# Patient Record
Sex: FEMALE | Race: WHITE | NOT HISPANIC OR LATINO | Employment: STUDENT | URBAN - METROPOLITAN AREA
[De-identification: names, ages, dates, MRNs, and addresses within clinical notes are randomized per-mention and may not be internally consistent; named-entity substitution may affect disease eponyms.]

---
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
## 2019-01-03 ENCOUNTER — ANNUAL EXAM (OUTPATIENT)
Dept: OBGYN CLINIC | Facility: CLINIC | Age: 19
End: 2019-01-03
Payer: COMMERCIAL

## 2019-01-03 VITALS
DIASTOLIC BLOOD PRESSURE: 100 MMHG | HEIGHT: 63 IN | BODY MASS INDEX: 25.41 KG/M2 | WEIGHT: 143.4 LBS | SYSTOLIC BLOOD PRESSURE: 160 MMHG

## 2019-01-03 DIAGNOSIS — Z30.09 ENCOUNTER FOR COUNSELING REGARDING CONTRACEPTION: ICD-10-CM

## 2019-01-03 DIAGNOSIS — Z01.419 ENCOUNTER FOR ANNUAL ROUTINE GYNECOLOGICAL EXAMINATION: Primary | ICD-10-CM

## 2019-01-03 PROCEDURE — S0612 ANNUAL GYNECOLOGICAL EXAMINA: HCPCS | Performed by: OBSTETRICS & GYNECOLOGY

## 2019-01-03 NOTE — PROGRESS NOTES
Assessment/Plan:    Elevated blood pressure-recheck at the end of her visit was the same  We discussed Depo-Provera as a possible option since estrogen containing birth control may be contributing to her elevated blood pressure  She will remove the patch today and have her father recheck her blood pressure several times in the next week or 2  Her father is a family doctor  Assuming it decreases, she will start Depo-Provera with her next menstrual cycle  We did discuss that sometimes Depo-Provera can cause mood changes and I would recommend a visit at the time of her 2nd dose to see how she was doing  Also, she is in college at [employer] in which is close by and her mother talks to her on a regular basis  We discussed this with her mother and she will watch her closely  I would like to make sure her blood pressure is in a normal were close to normal range before starting Depo-Provera  They are agreeable they will call us with some blood pressure readings tomorrow and in the next few weeks  She is aware that if she becomes sexually active, she must use condoms  No problem-specific Assessment & Plan notes found for this encounter  Diagnoses and all orders for this visit:    Encounter for annual routine gynecological examination    Encounter for counseling regarding contraception          Subjective:      Patient ID: Keny Palacios is a 22 y o  female  Patient here for yearly and to discuss contraception  She was started on the contraceptive patch  She actually has not been sexually active but last year she had a boyfriend and her parents started her on Depo-Provera  She was undergoing stress at that time and while she was on the Depo-Provera, she had symptoms of depression  This was stopped and she was then started on the contraceptive patch  She is currently not sexually active but is contemplating this in the future and would like to discuss the different birth control    She actually would like to try Depo-Provera again  She is feeling much better in terms of her mood  She is a freshman in college and is happy there  Of note, her blood pressure today at check in was 160/100, this was checked twice  She is asymptomatic  The following portions of the patient's history were reviewed and updated as appropriate: allergies, current medications, past family history, past medical history, past social history, past surgical history and problem list     Review of Systems   Constitutional: Negative  HENT: Negative  Eyes: Negative  Respiratory: Negative  Cardiovascular: Negative  Gastrointestinal: Negative  Endocrine: Negative  Genitourinary: Negative  Musculoskeletal: Negative  Skin: Negative  Allergic/Immunologic: Negative  Neurological: Negative  Hematological: Negative  Psychiatric/Behavioral: Negative  Objective:      /100 (BP Location: Left arm, Patient Position: Sitting, Cuff Size: Standard)   Ht 5' 3" (1 6 m)   Wt 65 kg (143 lb 6 4 oz)   LMP 12/20/2018 (Exact Date)   BMI 25 40 kg/m²          Physical Exam   Constitutional: She appears well-developed  Neck: No tracheal deviation present  No thyromegaly present  Cardiovascular: Normal rate and regular rhythm  Pulmonary/Chest: Effort normal and breath sounds normal  Right breast exhibits no inverted nipple, no mass, no nipple discharge, no skin change and no tenderness  Left breast exhibits no inverted nipple, no mass, no nipple discharge, no skin change and no tenderness  Breasts are symmetrical    Examined seated and supine   Abdominal: Soft  She exhibits no distension and no mass  There is no tenderness  Genitourinary: Rectum normal  No labial fusion  There is no rash, tenderness, lesion or injury on the right labia  There is no rash, tenderness, lesion or injury on the left labia  Genitourinary Comments: Pelvic exam deferred   Vitals reviewed

## 2019-01-09 ENCOUNTER — TELEPHONE (OUTPATIENT)
Dept: OBGYN CLINIC | Facility: CLINIC | Age: 19
End: 2019-01-09

## 2019-01-10 NOTE — TELEPHONE ENCOUNTER
Ok, thanks, once it is under better control, we can try the depo provera again, they can call when she is ready

## 2019-01-21 ENCOUNTER — TRANSCRIBE ORDERS (OUTPATIENT)
Dept: ADMINISTRATIVE | Facility: HOSPITAL | Age: 19
End: 2019-01-21

## 2019-01-21 DIAGNOSIS — I77.3 FIBROMUSCULAR HYPERPLASIA OF RENAL ARTERY (HCC): ICD-10-CM

## 2019-01-21 DIAGNOSIS — I10 ESSENTIAL HYPERTENSION, MALIGNANT: Primary | ICD-10-CM

## 2019-01-31 ENCOUNTER — HOSPITAL ENCOUNTER (OUTPATIENT)
Dept: NON INVASIVE DIAGNOSTICS | Facility: CLINIC | Age: 19
Discharge: HOME | End: 2019-01-31
Payer: COMMERCIAL

## 2019-01-31 ENCOUNTER — HOSPITAL ENCOUNTER (OUTPATIENT)
Dept: RADIOLOGY | Facility: HOSPITAL | Age: 19
Discharge: HOME | End: 2019-01-31
Payer: COMMERCIAL

## 2019-01-31 DIAGNOSIS — I10 ESSENTIAL HYPERTENSION, MALIGNANT: ICD-10-CM

## 2019-01-31 DIAGNOSIS — I77.3 FIBROMUSCULAR HYPERPLASIA OF RENAL ARTERY (HCC): ICD-10-CM

## 2019-01-31 PROCEDURE — 93975 VASCULAR STUDY: CPT

## 2019-01-31 PROCEDURE — 93975 VASCULAR STUDY: CPT | Performed by: SURGERY

## 2019-01-31 PROCEDURE — 76770 US EXAM ABDO BACK WALL COMP: CPT

## 2019-02-05 DIAGNOSIS — N92.6 IRREGULAR MENSES: Primary | ICD-10-CM

## 2019-02-05 RX ORDER — MEDROXYPROGESTERONE ACETATE 150 MG/ML
150 INJECTION, SUSPENSION INTRAMUSCULAR
Qty: 1 ML | Refills: 3 | Status: SHIPPED | OUTPATIENT
Start: 2019-02-05 | End: 2020-01-07 | Stop reason: SDUPTHER

## 2019-02-05 NOTE — PROGRESS NOTES
Pt's mother called and left a message stating that Lucille's BP is better  She started her menstrual cycle today and would like to start Depo Provera as discussed at her last visit  RX sent and she will come in for this on Thursday

## 2019-02-07 ENCOUNTER — CLINICAL SUPPORT (OUTPATIENT)
Dept: OBGYN CLINIC | Facility: CLINIC | Age: 19
End: 2019-02-07
Payer: COMMERCIAL

## 2019-02-07 VITALS — BODY MASS INDEX: 22 KG/M2 | WEIGHT: 140.2 LBS | HEIGHT: 67 IN

## 2019-02-07 DIAGNOSIS — Z30.013 ENCOUNTER FOR INITIAL PRESCRIPTION OF INJECTABLE CONTRACEPTIVE: Primary | ICD-10-CM

## 2019-02-07 PROCEDURE — 81025 URINE PREGNANCY TEST: CPT

## 2019-02-07 PROCEDURE — 96372 THER/PROPH/DIAG INJ SC/IM: CPT

## 2019-02-07 RX ORDER — DEXMETHYLPHENIDATE HYDROCHLORIDE 10 MG/1
10 TABLET ORAL 2 TIMES DAILY
Refills: 0 | COMMUNITY
Start: 2019-01-31

## 2019-02-07 RX ORDER — MEDROXYPROGESTERONE ACETATE 150 MG/ML
150 INJECTION, SUSPENSION INTRAMUSCULAR ONCE
Status: COMPLETED | OUTPATIENT
Start: 2019-02-07 | End: 2019-02-18

## 2019-02-18 LAB — SL AMB POCT URINE HCG: NORMAL

## 2019-02-18 RX ADMIN — MEDROXYPROGESTERONE ACETATE 150 MG: 150 INJECTION, SUSPENSION INTRAMUSCULAR at 11:31

## 2019-05-02 ENCOUNTER — OFFICE VISIT (OUTPATIENT)
Dept: OBGYN CLINIC | Facility: CLINIC | Age: 19
End: 2019-05-02
Payer: COMMERCIAL

## 2019-05-02 ENCOUNTER — CLINICAL SUPPORT (OUTPATIENT)
Dept: OBGYN CLINIC | Facility: CLINIC | Age: 19
End: 2019-05-02
Payer: COMMERCIAL

## 2019-05-02 VITALS
BODY MASS INDEX: 19.93 KG/M2 | DIASTOLIC BLOOD PRESSURE: 72 MMHG | WEIGHT: 127 LBS | HEIGHT: 67 IN | SYSTOLIC BLOOD PRESSURE: 120 MMHG

## 2019-05-02 VITALS
HEIGHT: 67 IN | SYSTOLIC BLOOD PRESSURE: 120 MMHG | WEIGHT: 127 LBS | DIASTOLIC BLOOD PRESSURE: 72 MMHG | BODY MASS INDEX: 19.93 KG/M2

## 2019-05-02 DIAGNOSIS — Z30.42 ENCOUNTER FOR DEPO-PROVERA CONTRACEPTION: Primary | ICD-10-CM

## 2019-05-02 DIAGNOSIS — Z30.42 SURVEILLANCE FOR DEPO-PROVERA CONTRACEPTION: Primary | ICD-10-CM

## 2019-05-02 PROCEDURE — 96372 THER/PROPH/DIAG INJ SC/IM: CPT | Performed by: OBSTETRICS & GYNECOLOGY

## 2019-05-02 PROCEDURE — 99213 OFFICE O/P EST LOW 20 MIN: CPT | Performed by: OBSTETRICS & GYNECOLOGY

## 2019-05-02 RX ORDER — MEDROXYPROGESTERONE ACETATE 150 MG/ML
150 INJECTION, SUSPENSION INTRAMUSCULAR ONCE
Status: COMPLETED | OUTPATIENT
Start: 2019-05-02 | End: 2019-05-02

## 2019-05-02 RX ADMIN — MEDROXYPROGESTERONE ACETATE 150 MG: 150 INJECTION, SUSPENSION INTRAMUSCULAR at 16:08

## 2019-07-18 ENCOUNTER — OFFICE VISIT (OUTPATIENT)
Dept: OBGYN CLINIC | Facility: CLINIC | Age: 19
End: 2019-07-18
Payer: COMMERCIAL

## 2019-07-18 ENCOUNTER — DOCUMENTATION (OUTPATIENT)
Dept: OBGYN CLINIC | Facility: CLINIC | Age: 19
End: 2019-07-18

## 2019-07-18 VITALS
WEIGHT: 128.8 LBS | HEIGHT: 67 IN | DIASTOLIC BLOOD PRESSURE: 84 MMHG | BODY MASS INDEX: 20.21 KG/M2 | SYSTOLIC BLOOD PRESSURE: 132 MMHG

## 2019-07-18 DIAGNOSIS — Z30.42 ENCOUNTER FOR SURVEILLANCE OF INJECTABLE CONTRACEPTIVE: Primary | ICD-10-CM

## 2019-07-18 DIAGNOSIS — Z30.42 DEPO-PROVERA CONTRACEPTIVE STATUS: ICD-10-CM

## 2019-07-18 PROCEDURE — 96372 THER/PROPH/DIAG INJ SC/IM: CPT | Performed by: OBSTETRICS & GYNECOLOGY

## 2019-07-18 RX ORDER — MEDROXYPROGESTERONE ACETATE 150 MG/ML
150 INJECTION, SUSPENSION INTRAMUSCULAR ONCE
Status: COMPLETED | OUTPATIENT
Start: 2019-07-18 | End: 2019-07-18

## 2019-07-18 RX ADMIN — MEDROXYPROGESTERONE ACETATE 150 MG: 150 INJECTION, SUSPENSION INTRAMUSCULAR at 14:22

## 2019-07-18 NOTE — PROGRESS NOTES
Patient is here for her 12 week Depo Injection  Patient is c/o of heavy clotting and heavy bleeding during menses  Patient still wanted depo injection  Depo was injected into left deltoid  Patient will return in 12 weeks for next injection

## 2019-07-18 NOTE — PROGRESS NOTES
Patient was here for her 3rd Depo-Provera injection and had questions  I spoke with her and she states that she has been having spotting just about every day but a few months ago she had 1 day of heavy bleeding and then 4 days ago she had another day of very heavy bleeding with blood clots  This time she bled through her underwear and her pants  This is not usual for her  She has otherwise been happy with Depo-Provera, her mood has not been an issue  She is not sexually active but may consider this in the future  I explained that irregular spotting is very common on Depo-Provera, typically the bleeding is not heavy  Since she has not been on it for too long, I would recommend that she continue to observe and the bleeding should decrease  If it does not, she will let us know and we can consider another option  I also stressed the importance of condom use if she does become sexually active  She understands this

## 2020-01-07 ENCOUNTER — ANNUAL EXAM (OUTPATIENT)
Dept: OBGYN CLINIC | Facility: CLINIC | Age: 20
End: 2020-01-07
Payer: COMMERCIAL

## 2020-01-07 VITALS
SYSTOLIC BLOOD PRESSURE: 106 MMHG | DIASTOLIC BLOOD PRESSURE: 72 MMHG | HEIGHT: 63 IN | BODY MASS INDEX: 22.71 KG/M2 | WEIGHT: 128.2 LBS

## 2020-01-07 DIAGNOSIS — N92.6 IRREGULAR MENSES: ICD-10-CM

## 2020-01-07 DIAGNOSIS — Z01.419 ENCOUNTER FOR ANNUAL ROUTINE GYNECOLOGICAL EXAMINATION: Primary | ICD-10-CM

## 2020-01-07 PROCEDURE — 99395 PREV VISIT EST AGE 18-39: CPT | Performed by: OBSTETRICS & GYNECOLOGY

## 2020-01-07 RX ORDER — MEDROXYPROGESTERONE ACETATE 150 MG/ML
INJECTION, SUSPENSION INTRAMUSCULAR
Qty: 1 ML | Refills: 3 | Status: SHIPPED | OUTPATIENT
Start: 2020-01-07

## 2020-01-07 RX ORDER — ESCITALOPRAM OXALATE 5 MG/1
5 TABLET ORAL DAILY
COMMUNITY
Start: 2019-12-19

## 2020-01-07 NOTE — PROGRESS NOTES
Assessment/Plan:    She does not yet require a Pap    Discussed self breast exams    BTB on depo provera - we discussed this in detail  We reviewed options such as progesterone only pills, nexplanon, add back with a small amount of estrogen  I am hesitant to do this because her BP was very high with combination contraceptives  She has had some stretces of time when the BTB subsided  She will continue the depo provera for now but if it does not get significantly better, she will call and we will consider one of the alternatives  I also recommended trying to give the Depo every 11 weeks  RX sent  She is agreeable with this  I recommended Ca and vit D along with regular exercise  No problem-specific Assessment & Plan notes found for this encounter  Diagnoses and all orders for this visit:    Encounter for annual routine gynecological examination    Irregular menses  -     medroxyPROGESTERone (DEPO-PROVERA) 150 mg/mL injection; Please give in office every 11 weeks  Other orders  -     escitalopram (LEXAPRO) 5 mg tablet; Take 5 mg by mouth daily          Subjective:      Patient ID: Genet Vegas is a 20 y o  female  Patient here for yearly  She has been on Depo-Provera but has been having breakthrough bleeding  This has been light but persistent, she had two months recently when it improved  Depo Provera was chosen because her blood pressure was elevated on the patch    She is on this for contraception although she has not yet been sexually active  No other gyn complaints  BP and weight are good  The following portions of the patient's history were reviewed and updated as appropriate: allergies, current medications, past family history, past medical history, past social history, past surgical history and problem list     Review of Systems   Constitutional: Negative  Gastrointestinal: Negative  Genitourinary: Positive for vaginal bleeding  Objective:       There were no vitals taken for this visit  Physical Exam   Constitutional: She appears well-developed  Neck: No tracheal deviation present  No thyromegaly present  Cardiovascular: Normal rate and regular rhythm  Pulmonary/Chest: Effort normal and breath sounds normal  Right breast exhibits no inverted nipple, no mass, no nipple discharge, no skin change and no tenderness  Left breast exhibits no inverted nipple, no mass, no nipple discharge, no skin change and no tenderness  Breasts are symmetrical    Examined seated and supine   Abdominal: Soft  She exhibits no distension and no mass  There is no tenderness  Genitourinary: Vagina normal and uterus normal  No labial fusion  There is no rash, tenderness, lesion or injury on the right labia  There is no rash, tenderness, lesion or injury on the left labia  Cervix exhibits no motion tenderness, no discharge and no friability  Right adnexum displays no mass, no tenderness and no fullness  Left adnexum displays no mass, no tenderness and no fullness  Genitourinary Comments: Small speculum used to look at er cervix  Limited visualization due to her menses and discomfort, but appears normal    Vitals reviewed

## 2021-01-15 ENCOUNTER — ANNUAL EXAM (OUTPATIENT)
Dept: OBGYN CLINIC | Facility: CLINIC | Age: 21
End: 2021-01-15
Payer: COMMERCIAL

## 2021-01-15 VITALS
WEIGHT: 155.6 LBS | BODY MASS INDEX: 27.57 KG/M2 | HEIGHT: 63 IN | SYSTOLIC BLOOD PRESSURE: 122 MMHG | DIASTOLIC BLOOD PRESSURE: 70 MMHG

## 2021-01-15 DIAGNOSIS — A64 STD (FEMALE): ICD-10-CM

## 2021-01-15 DIAGNOSIS — Z12.4 CERVICAL CANCER SCREENING: ICD-10-CM

## 2021-01-15 DIAGNOSIS — Z01.419 ENCOUNTER FOR ANNUAL ROUTINE GYNECOLOGICAL EXAMINATION: Primary | ICD-10-CM

## 2021-01-15 PROCEDURE — 87491 CHLMYD TRACH DNA AMP PROBE: CPT | Performed by: OBSTETRICS & GYNECOLOGY

## 2021-01-15 PROCEDURE — G0145 SCR C/V CYTO,THINLAYER,RESCR: HCPCS | Performed by: PATHOLOGY

## 2021-01-15 PROCEDURE — G0124 SCREEN C/V THIN LAYER BY MD: HCPCS | Performed by: PATHOLOGY

## 2021-01-15 PROCEDURE — 87591 N.GONORRHOEAE DNA AMP PROB: CPT | Performed by: OBSTETRICS & GYNECOLOGY

## 2021-01-15 PROCEDURE — S0612 ANNUAL GYNECOLOGICAL EXAMINA: HCPCS | Performed by: OBSTETRICS & GYNECOLOGY

## 2021-01-15 RX ORDER — DESVENLAFAXINE 100 MG/1
75 TABLET, EXTENDED RELEASE ORAL DAILY
COMMUNITY

## 2021-01-15 NOTE — PROGRESS NOTES
Assessment/Plan:    pap with reflex done    Chlamydia and gonorrhea done    Will check on coverage for Nexplanon  Assuming this covered, we can then schedule her for a surgeon  She just had her Depo in December so there is time  Discussed continued use of condoms as well  Discussed self breast exams        discussed preventive care, regular exercise and a healthy diet      No problem-specific Assessment & Plan notes found for this encounter  Diagnoses and all orders for this visit:    Encounter for annual routine gynecological examination  -     Liquid-based pap, screening    STD (female)  -     Chlamydia/GC amplified DNA by PCR    Cervical cancer screening    Other orders  -     desvenlafaxine (PRISTIQ) 100 mg 24 hr tablet; Take 50 mg by mouth daily          Subjective:      Patient ID: Joleen Pearce is a 23 y o  female  Patient here for yearly  She has been on Depo-Provera with no complaints  She is interested in Nexplanon for the convenience  She is sexually active although not currently and has used condoms  She is due for a baseline Pap  The following portions of the patient's history were reviewed and updated as appropriate: allergies, current medications, past family history, past medical history, past social history, past surgical history and problem list     Review of Systems   Constitutional: Negative  Gastrointestinal: Negative  Genitourinary: Negative  Objective:      /70   Ht 5' 3" (1 6 m)   Wt 70 6 kg (155 lb 9 6 oz)   LMP 01/03/2021   BMI 27 56 kg/m²          Physical Exam  Vitals signs reviewed  Constitutional:       Appearance: She is well-developed  Neck:      Thyroid: No thyromegaly  Trachea: No tracheal deviation  Cardiovascular:      Rate and Rhythm: Normal rate and regular rhythm  Pulmonary:      Effort: Pulmonary effort is normal       Breath sounds: Normal breath sounds     Chest:      Breasts: Breasts are symmetrical  Right: No inverted nipple, mass, nipple discharge, skin change or tenderness  Left: No inverted nipple, mass, nipple discharge, skin change or tenderness  Abdominal:      General: There is no distension  Palpations: Abdomen is soft  There is no mass  Tenderness: There is no abdominal tenderness  Genitourinary:     Labia:         Right: No rash, tenderness, lesion or injury  Left: No rash, tenderness, lesion or injury  Vagina: Normal       Cervix: No cervical motion tenderness, discharge or friability  Adnexa:         Right: No mass, tenderness or fullness  Left: No mass, tenderness or fullness

## 2021-01-21 LAB
LAB AP GYN PRIMARY INTERPRETATION: NORMAL
Lab: NORMAL
PATH INTERP SPEC-IMP: NORMAL

## 2021-01-23 LAB
C TRACH DNA SPEC QL NAA+PROBE: NEGATIVE
N GONORRHOEA DNA SPEC QL NAA+PROBE: NEGATIVE

## 2021-01-28 ENCOUNTER — TELEPHONE (OUTPATIENT)
Dept: OBGYN CLINIC | Facility: CLINIC | Age: 21
End: 2021-01-28

## 2021-01-28 NOTE — TELEPHONE ENCOUNTER
----- Message from Priya Morgan sent at 1/27/2021  7:51 AM EST -----  Regarding: FW: Nexplanon    ----- Message -----  From: Priya Morgan  Sent: 1/21/2021  10:31 AM EST  To: Cecilia Alvarenga MA  Subject: FW: Nexplanon                                    Please precert this nexplanon  Marybel Tillman  ----- Message -----  From: Eliana Nash DO  Sent: 1/15/2021  11:38 AM EST  To: Aruna Hare  Subject: Kodak Lemus,    This patient is interested in [address]  Can you please check on coverage, order if covered and then set her up for an appointment? Thanks and let me know if there is anything else I need to do  She is currently on Depo Provera and just had an injection at the beginning of December so we have some time  It should be placed before she needs her next Depo      Thank you  Lacey Osman

## 2021-01-28 NOTE — TELEPHONE ENCOUNTER
----- Message from Sharon Mccollum sent at 1/27/2021  7:51 AM EST -----  Regarding: FW: Nexplanon    ----- Message -----  From: Sharon Mccollum  Sent: 1/21/2021  10:31 AM EST  To: Tianna Pringle MA  Subject: FW: Nexplanon                                    Please precert this nexplanon  Olu Dominguez  ----- Message -----  From: Sanam Mark DO  Sent: 1/15/2021  11:38 AM EST  To: Gladis Calderon  Subject: Sarah Beth Jackson,    This patient is interested in [address]  Can you please check on coverage, order if covered and then set her up for an appointment? Thanks and let me know if there is anything else I need to do  She is currently on Depo Provera and just had an injection at the beginning of December so we have some time  It should be placed before she needs her next Depo      Thank you  Toya Brown

## 2021-03-04 ENCOUNTER — PROCEDURE VISIT (OUTPATIENT)
Dept: OBGYN CLINIC | Facility: CLINIC | Age: 21
End: 2021-03-04
Payer: COMMERCIAL

## 2021-03-04 DIAGNOSIS — Z30.017 NEXPLANON INSERTION: Primary | ICD-10-CM

## 2021-03-04 LAB — SL AMB POCT URINE HCG: NEGATIVE

## 2021-03-04 PROCEDURE — 11981 INSERTION DRUG DLVR IMPLANT: CPT | Performed by: OBSTETRICS & GYNECOLOGY

## 2021-03-04 PROCEDURE — 81025 URINE PREGNANCY TEST: CPT | Performed by: OBSTETRICS & GYNECOLOGY

## 2021-03-04 NOTE — PROGRESS NOTES
Universal Protocol:  Consent: Written consent obtained  Risks and benefits: risks, benefits and alternatives were discussed  Consent given by: patient  Time out: Immediately prior to procedure a "time out" was called to verify the correct patient, procedure, equipment, support staff and site/side marked as required  Patient understanding: patient states understanding of the procedure being performed  Patient consent: the patient's understanding of the procedure matches consent given  Procedure consent: procedure consent matches procedure scheduled  Relevant documents: relevant documents present and verified  Test results: test results available and properly labeled  Site marked: the operative site was marked  Required items: required blood products, implants, devices, and special equipment available  Patient identity confirmed: verbally with patient    Remove and insert drug implant    Date/Time: 3/4/2021 10:25 AM  Performed by: Yolanda Sherman MD  Authorized by: Yolanda Sherman MD     Indication:     Indication: Insertion of non-biodegradable drug delivery implant    Pre-procedure:     Pre-procedure timeout performed: yes      Prepped with: povidone-iodine      Local anesthetic:  Lidocaine 1%    The site was cleaned and prepped in a sterile fashion: yes    Procedure:     Procedure:   Insertion    Small stab incision was made in arm: yes      Left/right:  Left    Preloaded contraceptive capsule trocar was placed subdermally: yes      Visualization of implant was obtained: yes      Contraceptive capsule was inserted and trocar removed: yes      Visualization of notch in stylet and palpation of device: yes      Palpation confirms placement by provider and patient: yes      Site was closed with steri-strips and pressure bandage applied: yes

## 2022-01-21 ENCOUNTER — ANNUAL EXAM (OUTPATIENT)
Dept: OBGYN CLINIC | Facility: CLINIC | Age: 22
End: 2022-01-21
Payer: COMMERCIAL

## 2022-01-21 VITALS
BODY MASS INDEX: 23.26 KG/M2 | DIASTOLIC BLOOD PRESSURE: 80 MMHG | SYSTOLIC BLOOD PRESSURE: 126 MMHG | HEIGHT: 65 IN | WEIGHT: 139.6 LBS

## 2022-01-21 DIAGNOSIS — A64 STD (FEMALE): Primary | ICD-10-CM

## 2022-01-21 DIAGNOSIS — N89.8 VAGINAL DISCHARGE: ICD-10-CM

## 2022-01-21 DIAGNOSIS — Z01.419 ENCOUNTER FOR ANNUAL ROUTINE GYNECOLOGICAL EXAMINATION: ICD-10-CM

## 2022-01-21 PROCEDURE — 87591 N.GONORRHOEAE DNA AMP PROB: CPT | Performed by: OBSTETRICS & GYNECOLOGY

## 2022-01-21 PROCEDURE — 99395 PREV VISIT EST AGE 18-39: CPT | Performed by: OBSTETRICS & GYNECOLOGY

## 2022-01-21 PROCEDURE — 87210 SMEAR WET MOUNT SALINE/INK: CPT | Performed by: OBSTETRICS & GYNECOLOGY

## 2022-01-21 PROCEDURE — 87491 CHLMYD TRACH DNA AMP PROBE: CPT | Performed by: OBSTETRICS & GYNECOLOGY

## 2022-01-21 NOTE — PROGRESS NOTES
Assessment/Plan:    pap is up to date    Contraception - she will continue with Nexplanon     Vaginal discharge-no sign of yeast or bacterial vaginosis, wet mount is normal   I recommended that she come in when she has symptoms if this is persistent  Chlamydia and gonorrhea done today     Discussed self breast exams      discussed preventive care, regular exercise and a healthy diet      No problem-specific Assessment & Plan notes found for this encounter  Diagnoses and all orders for this visit:    Encounter for annual routine gynecological examination          Subjective:      Patient ID: Juan iKng is a 24 y o  female  Pt here for yearly  Over the past year, she has had 3 yeast infections  She had thick white discharge and itching  She used over-the-counter Monistat and also Diflucan  She had a Nexplanon placed last March  She is happy with this, regular menses  No BTB  Normal Pap and negative chlamydia and gonorrhea in January 2021  Would like a chlamydia and gonorrhea test   She recently had blood drawn for STD testing that was negative  This was done at school  The following portions of the patient's history were reviewed and updated as appropriate: allergies, current medications, past family history, past medical history, past social history, past surgical history and problem list     Review of Systems   Constitutional: Negative  Gastrointestinal: Negative  Genitourinary: Positive for vaginal discharge  Objective:      /80 (BP Location: Left arm, Patient Position: Sitting, Cuff Size: Standard)   Ht 5' 4 5" (1 638 m)   Wt 63 3 kg (139 lb 9 6 oz)   LMP 01/13/2022 (Exact Date)   BMI 23 59 kg/m²          Physical Exam  Vitals reviewed  Constitutional:       Appearance: She is well-developed  Neck:      Thyroid: No thyromegaly  Trachea: No tracheal deviation  Cardiovascular:      Rate and Rhythm: Normal rate and regular rhythm     Pulmonary: Effort: Pulmonary effort is normal       Breath sounds: Normal breath sounds  Chest:   Breasts: Breasts are symmetrical       Right: No inverted nipple, mass, nipple discharge, skin change or tenderness  Left: No inverted nipple, mass, nipple discharge, skin change or tenderness  Abdominal:      General: There is no distension  Palpations: Abdomen is soft  There is no mass  Tenderness: There is no abdominal tenderness  Genitourinary:     Labia:         Right: No rash, tenderness, lesion or injury  Left: No rash, tenderness, lesion or injury  Vagina: Normal       Cervix: No cervical motion tenderness, discharge or friability  Adnexa:         Right: No mass, tenderness or fullness  Left: No mass, tenderness or fullness       Musculoskeletal:      Comments:  Nexplanon palpated in upper, inner left arm

## 2022-01-23 LAB
C TRACH DNA SPEC QL NAA+PROBE: NEGATIVE
N GONORRHOEA DNA SPEC QL NAA+PROBE: NEGATIVE

## 2023-07-31 ENCOUNTER — PREPPED CHART (OUTPATIENT)
Dept: URBAN - METROPOLITAN AREA CLINIC 6 | Facility: CLINIC | Age: 23
End: 2023-07-31

## 2023-08-29 ENCOUNTER — NEW PATIENT (OUTPATIENT)
Dept: URBAN - METROPOLITAN AREA CLINIC 6 | Facility: CLINIC | Age: 23
End: 2023-08-29

## 2023-08-29 DIAGNOSIS — H55.01: ICD-10-CM

## 2023-08-29 DIAGNOSIS — E70.328: ICD-10-CM

## 2023-08-29 PROCEDURE — 92015 DETERMINE REFRACTIVE STATE: CPT

## 2023-08-29 PROCEDURE — 92004 COMPRE OPH EXAM NEW PT 1/>: CPT

## 2023-08-29 ASSESSMENT — VISUAL ACUITY
OD_SC: 20/30-1
OS_SC: 20/50+1

## 2023-08-29 ASSESSMENT — TONOMETRY
OS_IOP_MMHG: 16
OD_IOP_MMHG: 15

## 2023-08-29 ASSESSMENT — KERATOMETRY
OS_AXISANGLE2_DEGREES: 85
OD_K1POWER_DIOPTERS: 45.50
OD_K2POWER_DIOPTERS: 46.50
OS_K2POWER_DIOPTERS: 47.25
OD_AXISANGLE_DEGREES: 180
OS_AXISANGLE_DEGREES: 175
OD_AXISANGLE2_DEGREES: 90
OS_K1POWER_DIOPTERS: 46.25

## 2023-10-10 ENCOUNTER — ANNUAL EXAM (OUTPATIENT)
Dept: GYNECOLOGY | Facility: CLINIC | Age: 23
End: 2023-10-10
Payer: COMMERCIAL

## 2023-10-10 VITALS
BODY MASS INDEX: 25.47 KG/M2 | SYSTOLIC BLOOD PRESSURE: 138 MMHG | DIASTOLIC BLOOD PRESSURE: 86 MMHG | HEIGHT: 64 IN | WEIGHT: 149.2 LBS

## 2023-10-10 DIAGNOSIS — Z11.3 SCREENING FOR STD (SEXUALLY TRANSMITTED DISEASE): ICD-10-CM

## 2023-10-10 DIAGNOSIS — Z01.419 ENCOUNTER FOR ANNUAL ROUTINE GYNECOLOGICAL EXAMINATION: Primary | ICD-10-CM

## 2023-10-10 PROCEDURE — 87591 N.GONORRHOEAE DNA AMP PROB: CPT | Performed by: OBSTETRICS & GYNECOLOGY

## 2023-10-10 PROCEDURE — S0612 ANNUAL GYNECOLOGICAL EXAMINA: HCPCS | Performed by: OBSTETRICS & GYNECOLOGY

## 2023-10-10 PROCEDURE — 87491 CHLMYD TRACH DNA AMP PROBE: CPT | Performed by: OBSTETRICS & GYNECOLOGY

## 2023-10-10 PROCEDURE — G0145 SCR C/V CYTO,THINLAYER,RESCR: HCPCS | Performed by: OBSTETRICS & GYNECOLOGY

## 2023-10-10 RX ORDER — DULOXETIN HYDROCHLORIDE 60 MG/1
120 CAPSULE, DELAYED RELEASE ORAL DAILY
COMMUNITY
Start: 2023-10-05

## 2023-10-10 RX ORDER — BUPROPION HYDROCHLORIDE 300 MG/1
300 TABLET ORAL EVERY MORNING
COMMUNITY
Start: 2023-09-19

## 2023-10-10 RX ORDER — ALPRAZOLAM 0.5 MG/1
TABLET ORAL
COMMUNITY
Start: 2023-08-22

## 2023-10-10 RX ORDER — DEXTROAMPHETAMINE SACCHARATE, AMPHETAMINE ASPARTATE, DEXTROAMPHETAMINE SULFATE AND AMPHETAMINE SULFATE 3.75; 3.75; 3.75; 3.75 MG/1; MG/1; MG/1; MG/1
TABLET ORAL
COMMUNITY
Start: 2023-10-05

## 2023-10-10 RX ORDER — LISDEXAMFETAMINE DIMESYLATE 30 MG/1
CAPSULE ORAL
COMMUNITY
Start: 2023-08-23

## 2023-10-10 RX ORDER — PRAZOSIN HYDROCHLORIDE 2 MG/1
2 CAPSULE ORAL
COMMUNITY
Start: 2023-09-19

## 2023-10-10 NOTE — PROGRESS NOTES
Assessment/Plan:    Pap with reflex done     chlamydia and gonorrhea done    Discussed self breast exams    Nexplanon -she will schedule removal and insertion of a new Nexplanon for February or March. I sent sent note for prior auth to nursing    discussed preventive care, regular exercise and a healthy diet      No problem-specific Assessment & Plan notes found for this encounter. Diagnoses and all orders for this visit:    Encounter for annual routine gynecological examination  -     Liquid-based pap, screening  -     Chlamydia/GC amplified DNA by PCR    Screening for STD (sexually transmitted disease)  -     Chlamydia/GC amplified DNA by PCR    Other orders  -     tretinoin (RETIN-A) 0.025 % cream  -     prazosin (MINIPRESS) 2 mg capsule; Take 2 mg by mouth daily at bedtime  -     Vyvanse 30 MG capsule; TAKE 1 CAPSULE BY MOUTH EVERY DAY IN THE MORNING AS NEEDED  -     DULoxetine (CYMBALTA) 60 mg delayed release capsule; Take 120 mg by mouth daily  -     buPROPion (WELLBUTRIN XL) 300 mg 24 hr tablet; Take 300 mg by mouth every morning  -     amphetamine-dextroamphetamine (ADDERALL) 15 MG tablet; TAKE 1/2 TABLET BY MOUTH TWICE DAILY AS NEEDED  -     ALPRAZolam (XANAX) 0.5 mg tablet; TAKE 1 TABLET BY MOUTH EVERY DAY AS NEEDED FOR PANIC ATTACKS          Subjective:      Patient ID: Santiago Duane is a 20 y.o. female. Patient here for yearly. She has Nexplanon. This was placed March 2021. She is happy with this. She does not feel that it affects her mood. She would like to have another one next year    Normal Pap in January 2021, negative chlamydia and gonorrhea in 2022      The following portions of the patient's history were reviewed and updated as appropriate: allergies, current medications, past family history, past medical history, past social history, past surgical history and problem list.    Review of Systems   Constitutional: Negative. Gastrointestinal: Negative. Genitourinary: Negative. Objective: There were no vitals taken for this visit. Physical Exam  Vitals reviewed. Constitutional:       Appearance: She is well-developed. Neck:      Thyroid: No thyromegaly. Trachea: No tracheal deviation. Cardiovascular:      Rate and Rhythm: Normal rate and regular rhythm. Pulmonary:      Effort: Pulmonary effort is normal.      Breath sounds: Normal breath sounds. Chest:   Breasts:     Breasts are symmetrical.      Right: No inverted nipple, mass, nipple discharge, skin change or tenderness. Left: No inverted nipple, mass, nipple discharge, skin change or tenderness. Abdominal:      General: There is no distension. Palpations: Abdomen is soft. There is no mass. Tenderness: There is no abdominal tenderness. Genitourinary:     Labia:         Right: No rash, tenderness, lesion or injury. Left: No rash, tenderness, lesion or injury. Vagina: Normal.      Cervix: No cervical motion tenderness, discharge or friability. Adnexa:         Right: No mass, tenderness or fullness. Left: No mass, tenderness or fullness.

## 2023-10-11 LAB
C TRACH DNA SPEC QL NAA+PROBE: NEGATIVE
N GONORRHOEA DNA SPEC QL NAA+PROBE: NEGATIVE

## 2023-10-13 LAB
LAB AP GYN PRIMARY INTERPRETATION: NORMAL
LAB AP LMP: NORMAL
Lab: NORMAL

## 2024-03-26 ENCOUNTER — PROCEDURE VISIT (OUTPATIENT)
Dept: GYNECOLOGY | Facility: CLINIC | Age: 24
End: 2024-03-26
Payer: COMMERCIAL

## 2024-03-26 VITALS — WEIGHT: 146.8 LBS | BODY MASS INDEX: 25.6 KG/M2 | DIASTOLIC BLOOD PRESSURE: 70 MMHG | SYSTOLIC BLOOD PRESSURE: 120 MMHG

## 2024-03-26 DIAGNOSIS — Z30.46 ENCOUNTER FOR REMOVAL AND REINSERTION OF NEXPLANON: Primary | ICD-10-CM

## 2024-03-26 DIAGNOSIS — Z01.818 PREOPERATIVE TESTING: ICD-10-CM

## 2024-03-26 LAB — SL AMB POCT URINE HCG: NEGATIVE

## 2024-03-26 PROCEDURE — 11983 REMOVE/INSERT DRUG IMPLANT: CPT | Performed by: OBSTETRICS & GYNECOLOGY

## 2024-03-26 PROCEDURE — 81025 URINE PREGNANCY TEST: CPT | Performed by: OBSTETRICS & GYNECOLOGY

## 2024-03-26 NOTE — PROGRESS NOTES
Assessment/Plan   Diagnoses and all orders for this visit:    Encounter for removal and reinsertion of Nexplanon  -     Remove and insert drug implant    Preoperative testing  -     POCT urine HCG    1. Nexplanon removal with reinsertion-done without problems.  Patient tolerated the procedure well.  Light activity was recommended for the left arm over the next few days.  Signs and symptoms of infection were reviewed.  Ideally, recommend follow-up in 2 to 4 weeks for incision check, particularly given that the old Nexplanon was removed from the area between the biceps and triceps and the new Nexplanon was placed 3 cm posterior to this as per current recommendations.  She does go to college at [employer] in [place], so it might be difficult for her to come back for the follow-up visit.  She may try to go to local student health for evaluation if needed.  2. other-Pap/GC/chlamydia negative from previous visit with Dr. Hernandez from 10/10/2023.  3.  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  Congratulations given.  Follow-up 2 to 4 weeks for incision check.      Subjective   Patient ID: Lucille Worthy is a 23 y.o. female.    Vitals:    24 1331   BP: 120/70     HPI    The following portions of the patient's history were reviewed and updated as appropriate: allergies, current medications, past family history, past medical history, past social history, past surgical history, and problem list.  Past Medical History:   Diagnosis Date    ADHD (attention deficit hyperactivity disorder)      Past Surgical History:   Procedure Laterality Date    EYE SURGERY       OB History    Para Term  AB Living   0 0 0 0 0 0   SAB IAB Ectopic Multiple Live Births   0 0 0 0 0       Current Outpatient Medications:     ALPRAZolam (XANAX) 0.5 mg tablet, TAKE 1 TABLET BY MOUTH EVERY DAY AS NEEDED FOR PANIC ATTACKS, Disp: , Rfl:     amphetamine-dextroamphetamine (ADDERALL) 15 MG tablet, TAKE 1/2 TABLET BY  MOUTH TWICE DAILY AS NEEDED, Disp: , Rfl:     buPROPion (WELLBUTRIN XL) 300 mg 24 hr tablet, Take 300 mg by mouth every morning, Disp: , Rfl:     desvenlafaxine (PRISTIQ) 100 mg 24 hr tablet, Take 75 mg by mouth daily , Disp: , Rfl:     dexmethylphenidate (FOCALIN) 10 MG tablet, Take 10 mg by mouth 2 (two) times a day, Disp: , Rfl: 0    DULoxetine (CYMBALTA) 60 mg delayed release capsule, Take 120 mg by mouth daily, Disp: , Rfl:     escitalopram (LEXAPRO) 5 mg tablet, Take 5 mg by mouth daily, Disp: , Rfl:     medroxyPROGESTERone (DEPO-PROVERA) 150 mg/mL injection, Please give in office every 11 weeks., Disp: 1 mL, Rfl: 3    prazosin (MINIPRESS) 2 mg capsule, Take 2 mg by mouth daily at bedtime, Disp: , Rfl:     tretinoin (RETIN-A) 0.025 % cream, , Disp: , Rfl:     Vyvanse 30 MG capsule, TAKE 1 CAPSULE BY MOUTH EVERY DAY IN THE MORNING AS NEEDED, Disp: , Rfl:   No Known Allergies  Social History     Socioeconomic History    Marital status: Single     Spouse name: None    Number of children: 0    Years of education: None    Highest education level: None   Occupational History    None   Tobacco Use    Smoking status: Never    Smokeless tobacco: Never   Vaping Use    Vaping status: Never Used   Substance and Sexual Activity    Alcohol use: Yes    Drug use: No    Sexual activity: Not Currently   Other Topics Concern    None   Social History Narrative    Always uses seat belt    Caffeine use     Social Determinants of Health     Financial Resource Strain: Not on file   Food Insecurity: Not on file   Transportation Needs: Not on file   Physical Activity: Not on file   Stress: Not on file   Social Connections: Not on file   Intimate Partner Violence: Not on file   Housing Stability: Not on file     Family History   Problem Relation Age of Onset    Hypertension Maternal Grandfather     No Known Problems Mother     No Known Problems Father        Review of Systems    Objective   Physical Exam  OBGyn Exam     Objective       /70 (BP Location: Left arm, Patient Position: Sitting)   Wt 66.6 kg (146 lb 12.8 oz)   BMI 25.60 kg/m²     General:   alert and oriented, in no acute distress   Neck:    Breast:    Heart:    Lungs:    Abdomen: soft, non-tender, without masses or organomegaly   Vulva:    Vagina:    Cervix:    Uterus:    Adnexa:    Rectum:     Psych:  Normal mood and affect   Skin:  Without obvious lesions.  Nexplanon removal with reinsertion of the left arm as documented.   Eyes: symmetric, with normal movements and reactivity   Musculoskeletal:  Normal muscle tone and movements appreciated

## 2024-03-26 NOTE — PROGRESS NOTES
"Universal Protocol:  Consent: Verbal consent obtained. Written consent obtained.  Risks and benefits: risks, benefits and alternatives were discussed  Consent given by: patient  Time out: Immediately prior to procedure a \"time out\" was called to verify the correct patient, procedure, equipment, support staff and site/side marked as required.  Timeout called at: 3/26/2024 1:34 PM.  Patient understanding: patient states understanding of the procedure being performed  Patient consent: the patient's understanding of the procedure matches consent given  Procedure consent: procedure consent matches procedure scheduled  Relevant documents: relevant documents present and verified  Test results: test results available and properly labeled  Site marked: the operative site was marked  Patient identity confirmed: verbally with patient  Remove and insert drug implant    Date/Time: 3/26/2024 1:30 PM    Performed by: Isrrael Ching MD  Authorized by: Isrrael Ching MD    Indication:     Indication: Presence of non-biodegradable drug delivery implant    Pre-procedure:     Pre-procedure timeout performed: yes      Prepped with: chlorhexidine gluconate      Local anesthetic:  Lidocaine 1%    The site was cleaned and prepped in a sterile fashion: yes    Procedure:     Procedure:  Removal with reinsertion    Small stab incision was made in arm: yes      Left/right:  Left    Preloaded contraceptive capsule trocar was placed subdermally: yes      Visualization of implant was obtained: yes      Contraceptive capsule was inserted and trocar removed: yes      Visualization of notch in stylet and palpation of device: yes      Palpation confirms placement by provider and patient: yes      Site was closed with steri-strips and pressure bandage applied: yes    Comments:      Area was cleansed with Hibiclens.  In total, 5 cc of 1% lidocaine was utilized, evenly split between the distal aspect of the old Nexplanon and the presumed insertion of the " new Nexplanon.  0.75 cm incision was made over the distal aspect of the old Nexplanon and it was removed without difficulty in approximately 3 minutes time.  The new Nexplanon was then inserted without difficulty.  Insertion was confirmed by myself and the patient.  Steri-Strips followed by pressure bandage was placed over the incision sites.  Light activity of the left arm was recommended for the next few days.  Recommend follow-up 2 to 4 weeks for incision check or as needed.

## 2025-08-14 ENCOUNTER — ANNUAL EXAM (OUTPATIENT)
Dept: GYNECOLOGY | Facility: CLINIC | Age: 25
End: 2025-08-14
Payer: COMMERCIAL